# Patient Record
Sex: FEMALE | Race: AMERICAN INDIAN OR ALASKA NATIVE
[De-identification: names, ages, dates, MRNs, and addresses within clinical notes are randomized per-mention and may not be internally consistent; named-entity substitution may affect disease eponyms.]

---

## 2019-01-27 ENCOUNTER — HOSPITAL ENCOUNTER (EMERGENCY)
Dept: HOSPITAL 5 - ED | Age: 59
Discharge: HOME | End: 2019-01-27
Payer: COMMERCIAL

## 2019-01-27 VITALS — DIASTOLIC BLOOD PRESSURE: 75 MMHG | SYSTOLIC BLOOD PRESSURE: 146 MMHG

## 2019-01-27 DIAGNOSIS — Z86.718: ICD-10-CM

## 2019-01-27 DIAGNOSIS — I11.0: ICD-10-CM

## 2019-01-27 DIAGNOSIS — Z85.3: ICD-10-CM

## 2019-01-27 DIAGNOSIS — Z79.01: ICD-10-CM

## 2019-01-27 DIAGNOSIS — J11.1: Primary | ICD-10-CM

## 2019-01-27 DIAGNOSIS — I50.9: ICD-10-CM

## 2019-01-27 DIAGNOSIS — E11.9: ICD-10-CM

## 2019-01-27 LAB
BILIRUB UR QL STRIP: (no result)
BLOOD UR QL VISUAL: (no result)
PH UR STRIP: 6 [PH] (ref 5–7)
PROT UR STRIP-MCNC: (no result) MG/DL
RBC #/AREA URNS HPF: 3 /HPF (ref 0–6)
UROBILINOGEN UR-MCNC: 2 MG/DL (ref ?–2)
WBC #/AREA URNS HPF: < 1 /HPF (ref 0–6)

## 2019-01-27 PROCEDURE — 81001 URINALYSIS AUTO W/SCOPE: CPT

## 2019-01-27 PROCEDURE — 93010 ELECTROCARDIOGRAM REPORT: CPT

## 2019-01-27 PROCEDURE — 93005 ELECTROCARDIOGRAM TRACING: CPT

## 2019-01-27 NOTE — EMERGENCY DEPARTMENT REPORT
Minor Respiratory





- HPI


Chief Complaint: Upper Respiratory Infection


Stated Complaint: FLU LIKE


Time Seen by Provider: 01/27/19 13:56


Duration: 2 Days


Pain Location: Facial


Severity: moderate


Minor Respiratory: Yes Rhinorrhea, Yes Able to Tolerate Fluids, Yes Cough, Yes 

Sick Contacts, Yes Fever, No Sore Throat, No Chest Pain, No Shortness of Breath


Other History: Mrs. Scott is a very pleasant 58-year-old female with history of 

CHF, pulmonary embolism, breast cancer in remission, hypertension who presents 

with flulike symptoms for the last 2 days.  She has generalized malaise, fever 

102 at home, nasal congestion, headache, productive cough yellow sputum.  She 

has sick contacts at home with granddaughter and children neighborhood with kylah

lar symptoms.  She did not receive a flu vaccine this season.  She is followed 

by PCP Dr. Piedra in [place].





ED Review of Systems


ROS: 


Stated complaint: FLU LIKE


Other details as noted in HPI





Comment: All other systems reviewed and negative


Constitutional: fever, malaise


Cardiovascular: denies: chest pain





ED Past Medical Hx





- Past Medical History


Previous Medical History?: Yes


Hx Hypertension: Yes


Hx Congestive Heart Failure: Yes


Hx Diabetes: Yes


Hx Deep Vein Thrombosis: Yes (right lung 2014)


Additional medical history: chemotherapy 2012- left breast





- Surgical History


Past Surgical History?: Yes


Hx Cholecystectomy: Yes (2015)


Hx Breast Surgery: Yes (left 2012)


Additional Surgical History: mastectomy





- Social History


Smoking Status: Never Smoker


Substance Use Type: None





- Medications


Home Medications: 


                                Home Medications











 Medication  Instructions  Recorded  Confirmed  Last Taken  Type


 


Carvedilol [Coreg] 25 mg PO BID 08/04/16 08/04/16 08/04/16 09:00 History


 


Citalopram [celeXA] 20 mg PO QDAY 08/04/16 08/04/16 08/04/16 09:00 History


 


Esomeprazole Magnesium [NexIUM] 20 mg PO DAILY 08/04/16 08/04/16 08/04/16 09:00 

History


 


Furosemide [Lasix TAB] 40 mg PO QDAY 08/04/16 08/04/16 08/04/16 09:00 History


 


Lisinopril [Zestril] 40 mg PO DAILY 08/04/16 08/04/16 08/04/16 09:00 History


 


Metformin HCl [Glucophage] 500 mg PO DAILY 08/04/16 08/04/16 08/04/16 09:00 

History


 


Potassium Chloride 10 meq PO QDAY 08/04/16 08/04/16 08/04/16 09:00 History


 


Rivaroxaban [Xarelto] 20 mg PO QDAY 08/04/16 08/04/16 08/04/16 09:00 History


 


amLODIPine [Norvasc] 10 mg PO DAILY 08/04/16 08/04/16 08/04/16 09:00 History


 


hydrALAZINE [Apresoline] 25 mg PO Q8HR 08/04/16 08/04/16 08/04/16 09:00 History


 


traMADol [Ultram 50 MG tab] 50 mg PO Q6HR PRN #20 tablet 08/04/16  Unknown Rx


 


Acetaminophen/Codeine [Tylenol 2 tab PO Q6H PRN #16 tab 01/27/19  Unknown Rx





/Codeine # 3 tab]     


 


Oseltamivir [Tamiflu] 75 mg PO BID 5 Days #10 cap 01/27/19  Unknown Rx














Minor Respiratory Exam





- Exam


General: 


Vital signs noted. No distress. Alert and acting appropriately.


General:  Well-appearing, no acute distress


HEENT: Normocephalic atraumatic pt anicteric sclera 


  Nose: no rhinorrhea


  Oropharynx: Clear mucous membranes no lesions


Neck: supple, no meningismus


Chest: Clear to auscultation bilaterally no rales rhonchi no wheezes


Cardiac: Regular rate and rhythm no murmurs no rubs no gallops


Abdomen: Soft nontender nondistended positive bowel sounds no guarding


Extremities: No cyanosis 


Neuro: Moves all extremities 4, no gross deficits


Psychiatric: Alert and oriented 4 normal affect normal judgment normal insight


HEENT: Yes Moist Mucous Membranes, No Pharyngeal Erythema, No Pharyngeal 

Exudates, No Rhinorrhea, No Conjuctival Injection


Neck: Yes Supple, No Adenopathy


Lungs: Yes Good Air Exchange, No Wheezes, No Ronchi, No Stridor, No Cough, No 

Labored Respirations, No Retractions, No Use of Accessory Muscles, No Other A

bnormal Lung Sounds


Heart: Yes Regular, No Murmur


Abdomen: Yes Normal Bowel Sounds, No Tenderness, No Peritoneal Signs


Skin: No Rash, No Edema


Neurologic: 


Alert and oriented, no deficits.








Musculoskeletal: 


Unremarkable.











ED Course


                                   Vital Signs











  01/27/19





  12:59


 


Temperature 99 F


 


Pulse Rate 77


 


Respiratory 18





Rate 


 


Blood Pressure 146/75


 


O2 Sat by Pulse 99





Oximetry 














ED Medical Decision Making





- Medical Decision Making





Clinical diagnosis influenza, Rx: Tamiflu, Tylenol #3 





Mrs. Scott is taking warfarin.  Oseltamvir has little effect on warfarin 

pharmacokinetics


Critical care attestation.: 


If time is entered above; I have spent that time in minutes in the direct care 

of this critically ill patient, excluding procedure time.








ED Disposition


Clinical Impression: 


 Influenza, On warfarin therapy





Disposition: DC-01 TO HOME OR SELFCARE


Is pt being admited?: No


Does the pt Need Aspirin: No


Condition: Stable


Instructions:  Influenza (ED)


Prescriptions: 


Acetaminophen/Codeine [Tylenol /Codeine # 3 tab] 2 tab PO Q6H PRN #16 tab


 PRN Reason: pain or cough


Oseltamivir [Tamiflu] 75 mg PO BID 5 Days #10 cap


Referrals: 


PRIMARY CARE,MD [Referring] - 3-5 Days

## 2019-10-15 ENCOUNTER — HOSPITAL ENCOUNTER (OUTPATIENT)
Dept: HOSPITAL 5 - ED | Age: 59
Setting detail: OBSERVATION
LOS: 1 days | Discharge: HOME | End: 2019-10-16
Attending: INTERNAL MEDICINE | Admitting: INTERNAL MEDICINE
Payer: MEDICARE

## 2019-10-15 DIAGNOSIS — I50.9: ICD-10-CM

## 2019-10-15 DIAGNOSIS — Z85.3: ICD-10-CM

## 2019-10-15 DIAGNOSIS — I11.0: ICD-10-CM

## 2019-10-15 DIAGNOSIS — Z90.49: ICD-10-CM

## 2019-10-15 DIAGNOSIS — R11.2: ICD-10-CM

## 2019-10-15 DIAGNOSIS — Z86.711: ICD-10-CM

## 2019-10-15 DIAGNOSIS — Z86.718: ICD-10-CM

## 2019-10-15 DIAGNOSIS — Z79.84: ICD-10-CM

## 2019-10-15 DIAGNOSIS — E11.9: ICD-10-CM

## 2019-10-15 DIAGNOSIS — R07.89: Primary | ICD-10-CM

## 2019-10-15 LAB
ALBUMIN SERPL-MCNC: 4.3 G/DL (ref 3.9–5)
ALT SERPL-CCNC: 29 UNITS/L (ref 7–56)
APTT BLD: 27.3 SEC. (ref 24.2–36.6)
BACTERIA #/AREA URNS HPF: (no result) /HPF
BASOPHILS # (AUTO): 0 K/MM3 (ref 0–0.1)
BASOPHILS NFR BLD AUTO: 0.7 % (ref 0–1.8)
BILIRUB UR QL STRIP: (no result)
BLOOD UR QL VISUAL: (no result)
BUN SERPL-MCNC: 17 MG/DL (ref 7–17)
BUN/CREAT SERPL: 17 %
CALCIUM SERPL-MCNC: 10 MG/DL (ref 8.4–10.2)
EOSINOPHIL # BLD AUTO: 0.2 K/MM3 (ref 0–0.4)
EOSINOPHIL NFR BLD AUTO: 3.4 % (ref 0–4.3)
HCT VFR BLD CALC: 37.9 % (ref 30.3–42.9)
HEMOLYSIS INDEX: 17
HGB BLD-MCNC: 12.5 GM/DL (ref 10.1–14.3)
HYALINE CASTS #/AREA URNS LPF: 18 /LPF
INR PPP: 1.04 (ref 0.87–1.13)
LYMPHOCYTES # BLD AUTO: 2.9 K/MM3 (ref 1.2–5.4)
LYMPHOCYTES NFR BLD AUTO: 41.8 % (ref 13.4–35)
MCHC RBC AUTO-ENTMCNC: 33 % (ref 30–34)
MCV RBC AUTO: 81 FL (ref 79–97)
MONOCYTES # (AUTO): 0.5 K/MM3 (ref 0–0.8)
MONOCYTES % (AUTO): 7.2 % (ref 0–7.3)
MUCOUS THREADS #/AREA URNS HPF: (no result) /HPF
PH UR STRIP: 5 [PH] (ref 5–7)
PLATELET # BLD: 210 K/MM3 (ref 140–440)
PROT UR STRIP-MCNC: (no result) MG/DL
RBC # BLD AUTO: 4.71 M/MM3 (ref 3.65–5.03)
RBC #/AREA URNS HPF: 1 /HPF (ref 0–6)
UROBILINOGEN UR-MCNC: < 2 MG/DL (ref ?–2)
WBC #/AREA URNS HPF: 6 /HPF (ref 0–6)

## 2019-10-15 PROCEDURE — 85610 PROTHROMBIN TIME: CPT

## 2019-10-15 PROCEDURE — 80053 COMPREHEN METABOLIC PANEL: CPT

## 2019-10-15 PROCEDURE — 81001 URINALYSIS AUTO W/SCOPE: CPT

## 2019-10-15 PROCEDURE — 80048 BASIC METABOLIC PNL TOTAL CA: CPT

## 2019-10-15 PROCEDURE — 78452 HT MUSCLE IMAGE SPECT MULT: CPT

## 2019-10-15 PROCEDURE — 36415 COLL VENOUS BLD VENIPUNCTURE: CPT

## 2019-10-15 PROCEDURE — 85025 COMPLETE CBC W/AUTO DIFF WBC: CPT

## 2019-10-15 PROCEDURE — A9502 TC99M TETROFOSMIN: HCPCS

## 2019-10-15 PROCEDURE — 71275 CT ANGIOGRAPHY CHEST: CPT

## 2019-10-15 PROCEDURE — 99284 EMERGENCY DEPT VISIT MOD MDM: CPT

## 2019-10-15 PROCEDURE — 93017 CV STRESS TEST TRACING ONLY: CPT

## 2019-10-15 PROCEDURE — 93010 ELECTROCARDIOGRAM REPORT: CPT

## 2019-10-15 PROCEDURE — 71046 X-RAY EXAM CHEST 2 VIEWS: CPT

## 2019-10-15 PROCEDURE — 84484 ASSAY OF TROPONIN QUANT: CPT

## 2019-10-15 PROCEDURE — 82962 GLUCOSE BLOOD TEST: CPT

## 2019-10-15 PROCEDURE — 85730 THROMBOPLASTIN TIME PARTIAL: CPT

## 2019-10-15 PROCEDURE — 96372 THER/PROPH/DIAG INJ SC/IM: CPT

## 2019-10-15 PROCEDURE — G0378 HOSPITAL OBSERVATION PER HR: HCPCS

## 2019-10-15 PROCEDURE — 93005 ELECTROCARDIOGRAM TRACING: CPT

## 2019-10-15 NOTE — XRAY REPORT
CHEST 2 VIEWS 



INDICATION / CLINICAL INFORMATION:

Chest Pain. 



COMPARISON: 

8/4/2016



FINDINGS:



SUPPORT DEVICES: None.

HEART / MEDIASTINUM: No significant abnormality. 

LUNGS / PLEURA: No significant pulmonary or pleural abnormality. No pneumothorax. 



ADDITIONAL FINDINGS: There may be postoperative changes of the left breast.



IMPRESSION:

1. No acute findings. 



Signer Name: Miguel Ángel Sparks MD 

Signed: 10/15/2019 5:48 PM

 Workstation Name: [id number]

## 2019-10-15 NOTE — HISTORY AND PHYSICAL REPORT
History of Present Illness


Date of examination: 10/15/19


History of present illness: 


59- year-old woman with a history of hypertension, diabetes, breast cancer, 

DVT/PE comes emergency room complaints of chest pain for 4 days.  Chest pain is 

in the epigastric area,  described as a pin sensation, intermittent every 30 min

utes, intensity 5/10, no radiation, can't identify exacerbating or relieving 

factors.  Admits to shortness of breath, nausea vomiting, diaphoresis or 

palpitation.  


Review Of  Systems:


Constitutional: no weight loss, fever, chills


Ears, eyes, nose, mouth and throat: no nasal congestion, no nasal discharge, no 

sinus pressure, blurry vision, diplopia


Neck: No neck pain or rigidity.


Cardiovascular: No  palpitations


Respiratory: No  cough


Gastrointestinal: No hematochezia, abdominal pain


Genitourinary : no dysuria, frequency , hematuria


Musculoskeletal: no muscle ache , joint pain


Integumentary: no rash, no pruritis


Neurological: no parathesias, focal weakness


Endocrine: no cold or heat intolerance, no polyuria or polydipsia


Hematologic/Lymphatic: no easy bruising, no easy bleeding, no gland swelling


Allergic/Immunologic: no urticaria, no angioedema.  





PAST MEDICAL HISTORY:hypertension, diabetes, h/o breast cancer





PAST SURGICAL HISTORY:  left masectomy, cholecystectomy





FAMILY HISTORY:hypertension, diabetes





SOCIAL HISTORY: Denies  tobacco, drugs,  alcohol














Medications and Allergies


                                    Allergies











Allergy/AdvReac Type Severity Reaction Status Date / Time


 


No Known Allergies Allergy   Verified 10/15/19 23:00











                                Home Medications











 Medication  Instructions  Recorded  Confirmed  Last Taken  Type


 


Carvedilol [Coreg] 25 mg PO BID 08/04/16 10/15/19 08/04/16 09:00 History


 


Citalopram [Celexa] 20 mg PO QDAY 08/04/16 10/15/19 08/04/16 09:00 History


 


Esomeprazole Magnesium [NexIUM] 20 mg PO DAILY 08/04/16 10/15/19 08/04/16 09:00 

History


 


Furosemide [Lasix TAB] 40 mg PO QDAY 08/04/16 10/15/19 08/04/16 09:00 History


 


Lisinopril [Zestril] 40 mg PO DAILY 08/04/16 10/15/19 08/04/16 09:00 History


 


Metformin HCl [Glucophage] 500 mg PO DAILY 08/04/16 10/15/19 08/04/16 09:00 

History


 


Potassium Chloride 10 meq PO QDAY 08/04/16 10/15/19 08/04/16 09:00 History


 


amLODIPine [Norvasc] 10 mg PO DAILY 08/04/16 10/15/19 08/04/16 09:00 History














Exam





- Physical Exam


Narrative exam: 


General Apperance: The patient sitting in bed no acute distress


HEENT: Normocephalic, atraumatic.  Pupils equally round and reactive to light, 

extraocular movement intact, and no sclericterus or JVD or thyromegaly or 

nodule.  Neck supple, no carotid bruit, mucous membranes moist, no exudate or 

erythema


Heart: S1-S2, regular is rhythm


Lungs: Clear to auscultation bilaterally, breathing comfortable


Abdomen: Positive bowel sounds, soft, nontender, nondistended, no organomegaly


Extremities: No edema cyanosis clubbing


Skin: no rash, nodule, warm and dry


Neuro:CN 2 -12 intact, motor/sensory intact, speech is fluent





- Constitutional


Vitals: 


                                        











Temp Pulse Resp BP Pulse Ox


 


 98.6 F   65   15   91/59   99 


 


 10/15/19 17:28  10/15/19 19:30  10/15/19 19:30  10/15/19 19:30  10/15/19 19:30














Results





- Labs


CBC & Chem 7: 


                                 10/16/19 06:41





                                 10/16/19 06:41


Labs: 


                              Abnormal lab results











  10/15/19 10/15/19 Range/Units





  17:39 17:39 


 


MCH  27 L   (28-32)  pg


 


Lymph % (Auto)  41.8 H   (13.4-35.0)  %


 


Glucose   124 H  ()  mg/dL


 


Alkaline Phosphatase   134 H  ()  units/L


 


Total Protein   8.9 H  (6.3-8.2)  g/dL














- Imaging and Cardiology


Chest x-ray: report reviewed


CT scan - chest: report reviewed





Assessment and Plan


Asessment


Chest pain


Hypertension


Diabetes


breast cancer


h/o DVT/PE


Plan


Admit to medicine


Check cardiac enzymes,obtain stress test


Check fingersticks, start sliding scale


DVT prophalaxis, morphine

## 2019-10-15 NOTE — CAT SCAN REPORT
CTA CHEST WITH IV CONTRAST



INDICATION: cp, sob, hx of pe/dvt, breast ca in remission



CONTRAST: 100 cc Omnipaque 350 IV



COMPARISON: CTA chest 8/4/2016, report unavailable



Three-plane MIP reconstructions were produced. All CT scans at this location are performed using CT d
ose reduction for ALARA by means of automated exposure control. 



FINDINGS: Left breast prosthesis is again seen. No significant axillary or chest wall lesions are not
ed. No significant focal bony lesions are seen. Visualized portions of the upper abdomen show cholecy
stectomy changes. No mediastinal or hilar masses are seen. Small nodule near the minor fissure in the
 right middle lobe is stable. Small subpleural nodule in the right middle lobe anterolaterally is sta
ble. A peripheral nodule laterally in the left upper lobe is stable. A subpleural nodule laterally in
 the left lower lobe is stable. No new nodules or masses are seen. No areas of consolidation are note
d.



Aorta shows no aneurysmal dilatation or evidence of dissection.



Good opacification of the pulmonary arterial system was achieved. I do not see evidence of pulmonary 
thromboembolism.



IMPRESSION: 

1. No acute abnormalities are seen

2. Stable appearance of bilateral pulmonary nodularity



Signer Name: Terrell Rodriguez MD 

Signed: 10/15/2019 8:33 PM

 Workstation Name: [id number]

## 2019-10-16 VITALS — SYSTOLIC BLOOD PRESSURE: 113 MMHG | DIASTOLIC BLOOD PRESSURE: 66 MMHG

## 2019-10-16 LAB
BASOPHILS # (AUTO): 0 K/MM3 (ref 0–0.1)
BASOPHILS NFR BLD AUTO: 0.5 % (ref 0–1.8)
BUN SERPL-MCNC: 21 MG/DL (ref 7–17)
BUN/CREAT SERPL: 21 %
CALCIUM SERPL-MCNC: 9.7 MG/DL (ref 8.4–10.2)
EOSINOPHIL # BLD AUTO: 0.2 K/MM3 (ref 0–0.4)
EOSINOPHIL NFR BLD AUTO: 3.7 % (ref 0–4.3)
HCT VFR BLD CALC: 37.3 % (ref 30.3–42.9)
HEMOLYSIS INDEX: 4
HGB BLD-MCNC: 12.2 GM/DL (ref 10.1–14.3)
LYMPHOCYTES # BLD AUTO: 2.4 K/MM3 (ref 1.2–5.4)
LYMPHOCYTES NFR BLD AUTO: 41.1 % (ref 13.4–35)
MCHC RBC AUTO-ENTMCNC: 33 % (ref 30–34)
MCV RBC AUTO: 81 FL (ref 79–97)
MONOCYTES # (AUTO): 0.5 K/MM3 (ref 0–0.8)
MONOCYTES % (AUTO): 9.1 % (ref 0–7.3)
PLATELET # BLD: 195 K/MM3 (ref 140–440)
RBC # BLD AUTO: 4.63 M/MM3 (ref 3.65–5.03)

## 2019-10-16 NOTE — DISCHARGE SUMMARY
Providers





- Providers


Date of Admission: 


10/15/19 22:59





Date of discharge: 10/16/19


Attending physician: 


TITI BA





cardiology


Primary care physician: 


YULISSA PIEDRA








Hospitalization


Condition: Stable


Pertinent studies: 


 stress tests unremarkable.





Hospital course: 





Patient 64-year-old with a history of hypertension diabetes breast cancer DVT 

chest pain 4 days.  Appears to be associated with gastritis.  Intermittent 

moving around abdomen now chest pain-free has been that way since 

hospitalization.  Patient had negative cardiac enzymes negative stress test 

negative unremarkable EKG.  No shortness of breath no diffuse exertional chest 

pain now stable for discharge with follow-up with her primary care physician Dr. Piedra and also cardiology in 7 days.


Disposition: DC-01 TO HOME OR SELFCARE





- Discharge Diagnoses


(1) Chest pain


Status: Acute   





(2) Diabetes


Status: Acute   





(3) HTN (hypertension)


Status: Acute   





Core Measure Documentation





- Palliative Care


Palliative Care/ Comfort Measures: Not Applicable





- Core Measures


Any of the following diagnoses?: none





Exam





- Constitutional


Vitals: 


                                        











Temp Pulse Resp BP Pulse Ox


 


 97.4 F L  77   18   113/66   97 


 


 10/16/19 08:08  10/16/19 10:33  10/16/19 08:08  10/16/19 10:33  10/16/19 10:34











General appearance: Present: no acute distress, well-nourished





- EENT


Eyes: Present: PERRL


ENT: hearing intact, clear oral mucosa





- Neck


Neck: Present: supple, normal ROM





- Respiratory


Respiratory effort: normal


Respiratory: bilateral: CTA





- Cardiovascular


Heart Sounds: Present: S1 & S2.  Absent: rub, click





- Extremities


Extremities: pulses symmetrical, No edema


Peripheral Pulses: within normal limits





- Abdominal


General gastrointestinal: Present: soft, non-tender, non-distended, normal bowel

sounds


Female genitourinary: Present: normal





- Integumentary


Integumentary: Present: clear, warm, dry





- Musculoskeletal


Musculoskeletal: gait normal, strength equal bilaterally





- Psychiatric


Psychiatric: appropriate mood/affect, intact judgment & insight





- Neurologic


Neurologic: CNII-XII intact, moves all extremities





Plan


Diet: diabetic, low carbohydrate


Follow up with: 


YULISSA PIEDRA MD [Primary Care Provider] - 3-5 Days

## 2022-09-19 ENCOUNTER — HOSPITAL ENCOUNTER (EMERGENCY)
Dept: HOSPITAL 5 - ED | Age: 62
LOS: 1 days | Discharge: LEFT BEFORE DISCHARGE | End: 2022-09-20
Payer: MEDICARE

## 2022-09-19 VITALS — SYSTOLIC BLOOD PRESSURE: 146 MMHG | DIASTOLIC BLOOD PRESSURE: 75 MMHG

## 2022-09-19 DIAGNOSIS — Z53.21: ICD-10-CM

## 2022-09-19 DIAGNOSIS — R73.9: Primary | ICD-10-CM

## 2022-09-19 LAB
%HYPO/RBC NFR BLD AUTO: (no result) %
ALBUMIN SERPL-MCNC: 4.3 G/DL (ref 3.9–5)
ALT SERPL-CCNC: 28 UNITS/L (ref 7–56)
ANISOCYTOSIS BLD QL SMEAR: (no result)
BAND NEUTROPHILS # (MANUAL): 0 K/MM3
BUN SERPL-MCNC: 17 MG/DL (ref 7–17)
BUN/CREAT SERPL: 17 %
CALCIUM SERPL-MCNC: 9.5 MG/DL (ref 8.4–10.2)
HCT VFR BLD CALC: 37.8 % (ref 30.3–42.9)
HEMOLYSIS INDEX: 4
HGB BLD-MCNC: 12.2 GM/DL (ref 10.1–14.3)
MCHC RBC AUTO-ENTMCNC: 32 % (ref 30–34)
MCV RBC AUTO: 80 FL (ref 79–97)
MYELOCYTES # (MANUAL): 0 K/MM3
PLATELET # BLD: 143 K/MM3 (ref 140–440)
PROMYELOCYTES # (MANUAL): 0 K/MM3
RBC # BLD AUTO: 4.74 M/MM3 (ref 3.65–5.03)
TOTAL CELLS COUNTED BLD: 100

## 2022-09-19 PROCEDURE — 85007 BL SMEAR W/DIFF WBC COUNT: CPT

## 2022-09-19 PROCEDURE — 80053 COMPREHEN METABOLIC PANEL: CPT

## 2022-09-19 PROCEDURE — 36415 COLL VENOUS BLD VENIPUNCTURE: CPT

## 2022-09-19 PROCEDURE — 85025 COMPLETE CBC W/AUTO DIFF WBC: CPT

## 2022-09-19 PROCEDURE — 82962 GLUCOSE BLOOD TEST: CPT

## 2024-06-20 NOTE — TREADMILL REPORT
CARDIAC PERFUSION STUDY.



REASON FOR STUDY:  Chest pain.



READING PHYSICIAN:  Dr. Jarrell.



IMAGING PROTOCOL:  The patient received 10 mCi of Technetium 99m Tetrofosmin for

resting image and 28 mCi of Technetium 99m Tetrofosmin for stress imaging.  The

imaging for the whole procedure was completed 30-90 minutes following the

initial injection of Technetium 99m Tetrofosmin.  The SPECT imaging in the 180

degree arc was performed in the right anterior oblique projection.  Computerized

reconstruction of the images was performed for analysis.



IMAGING RESULTS:  Normal cavity size from stress to rest.  Normal distribution

of radionuclide in the anterior, inferior, septal, and apical regions.  Gated

SPECT, EF 53% with no wall motion abnormality.  The patient infused Lexiscan

with no EKG changes.



SUMMARY:

1.  Negative Lexiscan EKG.

2.  Normal rest and stress myocardial perfusion scan.  No significant ischemia. 

No wall motion on Gated SPECT, EF 53%.





DD: 10/16/2019 12:48

DT: 10/17/2019 03:11

JOB# [id number]  [id number]

[facility] Called to let pt know I have not received the fax.  Asked her what phone number it was faxed to and it was the wrong #. Gave her the correct #. She will call back to see if we received it